# Patient Record
Sex: MALE | Race: WHITE | Employment: FULL TIME | URBAN - METROPOLITAN AREA
[De-identification: names, ages, dates, MRNs, and addresses within clinical notes are randomized per-mention and may not be internally consistent; named-entity substitution may affect disease eponyms.]

---

## 2018-10-18 ENCOUNTER — OFFICE VISIT (OUTPATIENT)
Dept: HEMATOLOGY/ONCOLOGY | Age: 33
End: 2018-10-18
Attending: INTERNAL MEDICINE
Payer: COMMERCIAL

## 2018-10-18 VITALS
OXYGEN SATURATION: 98 % | WEIGHT: 201.19 LBS | DIASTOLIC BLOOD PRESSURE: 90 MMHG | SYSTOLIC BLOOD PRESSURE: 151 MMHG | TEMPERATURE: 98 F | RESPIRATION RATE: 18 BRPM | HEART RATE: 86 BPM

## 2018-10-18 DIAGNOSIS — R10.2 PELVIC PAIN: ICD-10-CM

## 2018-10-18 DIAGNOSIS — D75.1 POLYCYTHEMIA: Primary | ICD-10-CM

## 2018-10-18 DIAGNOSIS — R63.4 WEIGHT LOSS: ICD-10-CM

## 2018-10-18 DIAGNOSIS — R10.12 LEFT UPPER QUADRANT PAIN: ICD-10-CM

## 2018-10-18 PROBLEM — R53.83 OTHER FATIGUE: Status: ACTIVE | Noted: 2018-10-18

## 2018-10-18 PROCEDURE — 99245 OFF/OP CONSLTJ NEW/EST HI 55: CPT | Performed by: INTERNAL MEDICINE

## 2018-10-18 RX ORDER — MULTIVIT WITH MINERALS/LUTEIN
1000 TABLET ORAL DAILY
COMMUNITY
End: 2020-06-22

## 2018-10-18 RX ORDER — LISINOPRIL 10 MG/1
10 TABLET ORAL DAILY
COMMUNITY
End: 2020-06-22

## 2018-10-18 RX ORDER — ALPRAZOLAM 0.5 MG/1
0.5 TABLET ORAL 3 TIMES DAILY PRN
COMMUNITY
End: 2020-06-22

## 2018-10-18 RX ORDER — FLUOXETINE HYDROCHLORIDE 20 MG/1
20 CAPSULE ORAL DAILY
COMMUNITY
End: 2020-06-22

## 2018-10-18 NOTE — CONSULTS
[facility] Center Report of Consultation    Patient Name: Feng Sierra   YOB: 1985   Medical Record Number: [id number]   CSN: [id number]   Consulting Physician: Jacques Khan M.D.    Referring Physician: Radha Akhtar    Date of Consult Number of children: Not on file      Years of education: Not on file      Highest education level: Not on file    Social Needs      Financial resource strain: Not on file      Food insecurity - worry: Not on file      Food insecurity - inability: Not on fi urgency, hesitancy or incontinence. Integumentary Normal - No chronic rashes, inflammation, ulcerations or skin changes. Neurologic Normal - No headache, blurred vision, and no areas of focal weakness. Normal gait.    Psychiatric No depression, elver or EMR.  9/8/18 WBC 12.1, Hgb 17.5, , Plt 297  9/30/18 WBC 10.7, Hgb 16.1, RBC 5.64, Plt 267  10/8/18 WBC 9.8, Hgb 16.4, RBC 5.99, Plt 265    LILO 2 Negative      Radiology:    Pathology:    Impression and Plan:  Polycythemia: The patient had a single e

## 2018-10-24 ENCOUNTER — HOSPITAL ENCOUNTER (OUTPATIENT)
Dept: CT IMAGING | Age: 33
Discharge: HOME | End: 2018-10-24
Attending: INTERNAL MEDICINE
Payer: COMMERCIAL

## 2018-10-24 DIAGNOSIS — R63.4 WEIGHT LOSS: ICD-10-CM

## 2018-10-24 DIAGNOSIS — D75.1 POLYCYTHEMIA: ICD-10-CM

## 2018-10-24 DIAGNOSIS — R10.12 LEFT UPPER QUADRANT PAIN: ICD-10-CM

## 2018-10-24 DIAGNOSIS — R10.2 PELVIC PAIN: ICD-10-CM

## 2018-10-24 PROCEDURE — 71260 CT THORAX DX C+: CPT | Performed by: INTERNAL MEDICINE

## 2018-10-24 PROCEDURE — 74177 CT ABD & PELVIS W/CONTRAST: CPT | Performed by: INTERNAL MEDICINE

## 2018-10-31 ENCOUNTER — OFFICE VISIT (OUTPATIENT)
Dept: HEMATOLOGY/ONCOLOGY | Age: 33
End: 2018-10-31
Attending: INTERNAL MEDICINE
Payer: COMMERCIAL

## 2018-10-31 VITALS
SYSTOLIC BLOOD PRESSURE: 162 MMHG | HEART RATE: 98 BPM | TEMPERATURE: 98 F | DIASTOLIC BLOOD PRESSURE: 96 MMHG | WEIGHT: 203.19 LBS | RESPIRATION RATE: 18 BRPM | OXYGEN SATURATION: 98 %

## 2018-10-31 DIAGNOSIS — D75.1 SECONDARY POLYCYTHEMIA: Primary | ICD-10-CM

## 2018-10-31 DIAGNOSIS — D75.1 POLYCYTHEMIA: ICD-10-CM

## 2018-10-31 PROCEDURE — 99213 OFFICE O/P EST LOW 20 MIN: CPT | Performed by: INTERNAL MEDICINE

## 2018-11-02 NOTE — PROGRESS NOTES
Cancer Center Progress Note  Patient Name: Alyce Conroy   YOB: 1985   Medical Record Number: [id number]   CSN: [id number]   Attending Physician: Caridad Buerger, M.D.        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 No abnormal bleeding. Integumentary No rashes or yellowing of the skin   Neurologic No headache, blurred vision, and no areas of focal weakness. Normal gait. Psychiatric No insomnia, depression, elver or mood swings.          V CALCIUM Latest Ref Range: 8.3 - 10.3 mg/dL 8.3   BUN/CREA RATIO Latest Ref Range: 10.0 - 20.0  22.9 (H)   GFR, Non- Latest Ref Range: >=60  116   GFR, -American Latest Ref Range: >=60  134   ANION GAP Latest Ref Range: 0 - 18 mmol/ Units: % 19.3   Monocytes % Latest Units: % 10.6   Eosinophils % Latest Units: % 3.4   Basophils % Latest Units: % 0.2   Immature Granulocyte % Latest Units: % 0.2     Radiology:    Pathology:    Impression and Plan:  Secondary Polycythemia: With decreased

## 2020-06-22 PROBLEM — S43.432A TEAR OF LEFT GLENOID LABRUM: Status: ACTIVE | Noted: 2020-06-22

## 2020-09-18 PROBLEM — M75.42 IMPINGEMENT SYNDROME OF LEFT SHOULDER: Status: ACTIVE | Noted: 2020-09-18

## 2020-09-18 PROBLEM — S43.439A: Status: ACTIVE | Noted: 2020-06-22

## 2020-09-18 PROBLEM — S43.432S: Status: ACTIVE | Noted: 2020-06-22

## 2020-09-21 PROBLEM — Z47.89 ORTHOPEDIC AFTERCARE: Status: ACTIVE | Noted: 2020-09-21

## 2020-10-27 PROBLEM — M25.512 ACUTE POSTOPERATIVE PAIN OF LEFT SHOULDER: Status: ACTIVE | Noted: 2020-10-27

## 2020-10-27 PROBLEM — G89.18 ACUTE POSTOPERATIVE PAIN OF LEFT SHOULDER: Status: ACTIVE | Noted: 2020-10-27
